# Patient Record
Sex: FEMALE | Race: WHITE | Employment: OTHER | URBAN - METROPOLITAN AREA
[De-identification: names, ages, dates, MRNs, and addresses within clinical notes are randomized per-mention and may not be internally consistent; named-entity substitution may affect disease eponyms.]

---

## 2017-07-31 PROBLEM — I10 ESSENTIAL HYPERTENSION: Status: ACTIVE | Noted: 2017-07-31

## 2017-07-31 PROBLEM — E78.00 HYPERCHOLESTEROLEMIA: Status: ACTIVE | Noted: 2017-07-31

## 2018-03-15 PROCEDURE — 87186 SC STD MICRODIL/AGAR DIL: CPT | Performed by: FAMILY MEDICINE

## 2018-03-15 PROCEDURE — 87088 URINE BACTERIA CULTURE: CPT | Performed by: FAMILY MEDICINE

## 2018-03-15 PROCEDURE — 81001 URINALYSIS AUTO W/SCOPE: CPT | Performed by: FAMILY MEDICINE

## 2018-03-15 PROCEDURE — 87086 URINE CULTURE/COLONY COUNT: CPT | Performed by: FAMILY MEDICINE

## 2018-04-18 PROCEDURE — 86606 ASPERGILLUS ANTIBODY: CPT | Performed by: FAMILY MEDICINE

## 2018-04-18 PROCEDURE — 86635 COCCIDIOIDES ANTIBODY: CPT | Performed by: FAMILY MEDICINE

## 2018-04-18 PROCEDURE — 86612 BLASTOMYCES ANTIBODY: CPT | Performed by: FAMILY MEDICINE

## 2018-04-18 PROCEDURE — 86698 HISTOPLASMA ANTIBODY: CPT | Performed by: FAMILY MEDICINE

## 2019-07-11 PROBLEM — R91.8 PULMONARY NODULES/LESIONS, MULTIPLE: Status: ACTIVE | Noted: 2019-07-11

## 2021-08-09 PROBLEM — M41.27 OTHER IDIOPATHIC SCOLIOSIS, LUMBOSACRAL REGION: Status: ACTIVE | Noted: 2021-08-09

## 2021-11-26 PROBLEM — I77.819 AORTIC ECTASIA (HCC): Status: ACTIVE | Noted: 2021-11-26

## 2021-11-26 PROBLEM — G31.9 DIFFUSE BRAIN ATROPHY: Status: ACTIVE | Noted: 2021-11-26

## 2021-11-26 PROBLEM — I70.0 AORTIC ATHEROSCLEROSIS (HCC): Status: ACTIVE | Noted: 2021-11-26

## 2021-11-26 PROBLEM — J44.9 COPD (CHRONIC OBSTRUCTIVE PULMONARY DISEASE) (HCC): Status: ACTIVE | Noted: 2021-11-26

## 2021-11-26 PROBLEM — G31.9 DIFFUSE BRAIN ATROPHY (HCC): Status: ACTIVE | Noted: 2021-11-26

## 2021-11-26 PROBLEM — I70.0 AORTIC ATHEROSCLEROSIS: Status: ACTIVE | Noted: 2021-11-26

## 2021-11-26 PROBLEM — I77.819 AORTIC ECTASIA: Status: ACTIVE | Noted: 2021-11-26

## 2022-03-02 PROBLEM — J30.0 CHRONIC VASOMOTOR RHINITIS: Status: ACTIVE | Noted: 2022-03-02

## 2025-04-18 NOTE — DISCHARGE INSTRUCTIONS
HOME INSTRUCTIONS  [facility] Post-[facility] Care Instructions:  Leave patch in place until follow up appointment.  Positioning:   Mild pain or irritation is normal. Take Tylenol as needed for mild pain.  Call the office immediately with severe pain at [phone] at any time of day or night.  No driving until cleared by your surgeon.    [facility] HOME CARE INSTRUCTIONS: POST-OP ANESTHESIA  The medication that you received for sedation or general anesthesia can last up to 24 hours. Your judgment and reflexes may be altered, even if you feel like your normal self.      We Recommend:   Do not drive any motor vehicle or bicycle   Avoid mowing the lawn, playing sports, or working with power tools/applicances (power saws, electric knives or mixers)   That you have someone stay with you on your first night home   Do not drink alcohol or take sleeping pills or tranquilizers   Do not sign legal documents within 24 hours of your procedure   If you had a nerve block for your surgery, take extra care not to put any pressure on your arm or hand for 24 hours    It is normal:  For you to have a sore throat if you had a breathing tube during surgery (while you were asleep!). The sore throat should get better within 48 hours. You can gargle with warm salt water (1/2 tsp in 4 oz warm water) or use a throat lozenge for comfort  To feel muscle aches or soreness especially in the abdomen, chest or neck. The achy feeling should go away in the next 24 hours  To feel weak, sleepy or \"wiped out\". Your should start feeling better in the next 24 hours.   To experience mild discomforts such as sore lip or tongue, headache, cramps, gas pains or a bloated feeling in your abdomen.   To experience mild back pain or soreness for a day or two if you had spinal or epidural anesthesia.   If you had laparoscopic surgery, to feel shoulder pain or discomfort on the day of surgery.   For some patients to have nausea after surgery/anesthesia    If you feel  nausea or experience vomiting:   Try to move around less.   Eat less than usual or drink only liquids until the next morning   Nausea should resolve in about 24 hours    If you have a problem when you are at home:    Call your surgeons office   Discharge Instructions: After Your Surgery  You’ve just had surgery. During surgery, you were given medicine called anesthesia to keep you relaxed and free of pain. After surgery, you may have some pain or nausea. This is common. Here are some tips for feeling better and getting well after surgery.   Going home  Your healthcare provider will show you how to take care of yourself when you go home. They'll also answer your questions. Have an adult family member or friend drive you home. For the first 24 hours after your surgery:   Don't drive or use heavy equipment.  Don't make important decisions or sign legal papers.  Take medicines as directed.  Don't drink alcohol.  Have someone stay with you, if needed. They can watch for problems and help keep you safe.  Be sure to go to all follow-up visits with your healthcare provider. And rest after your surgery for as long as your provider tells you to.   Coping with pain  If you have pain after surgery, pain medicine will help you feel better. Take it as directed, before pain becomes severe. Also, ask your healthcare provider or pharmacist about other ways to control pain. This might be with heat, ice, or relaxation. And follow any other instructions your surgeon or nurse gives you.      Stay on schedule with your medicine.     Tips for taking pain medicine  To get the best relief possible, remember these points:   Pain medicines can upset your stomach. Taking them with a little food may help.  Most pain relievers taken by mouth need at least 20 to 30 minutes to start to work.  Don't wait till your pain becomes severe before you take your medicine. Try to time your medicine so that you can take it before starting an activity. This  might be before you get dressed, go for a walk, or sit down for dinner.  Constipation is a common side effect of some pain medicines. Call your healthcare provider before taking any medicines, such as laxatives or stool softeners, to help ease constipation. Also ask if you should skip any foods. Drinking lots of fluids and eating foods, such as fruits and vegetables, that are high in fiber can also help. Remember, don't take laxatives unless your surgeon has prescribed them.  Drinking alcohol and taking pain medicine can cause dizziness and slow your breathing. It can even be deadly. Don't drink alcohol while taking pain medicine.  Pain medicine can make you react more slowly to things. Don't drive or run machinery while taking pain medicine.  Your healthcare provider may tell you to take acetaminophen to help ease your pain. Ask them how much you're supposed to take each day. Acetaminophen or other pain relievers may interact with your prescription medicines or other over-the-counter (OTC) medicines. Some prescription medicines have acetaminophen and other ingredients in them. Using both prescription and OTC acetaminophen for pain can cause you to accidentally overdose. Read the labels on your OTC medicines with care. This will help you to clearly know the list of ingredients, how much to take, and any warnings. It may also help you not take too much acetaminophen. If you have questions or don't understand the information, ask your pharmacist or healthcare provider to explain it to you before you take the OTC medicine.   Managing nausea  Some people have an upset stomach (nausea) after surgery. This is often because of anesthesia, pain, or pain medicine, less movement of food in the stomach, or the stress of surgery. These tips will help you handle nausea and eat healthy foods as you get better. If you were on a special food plan before surgery, ask your healthcare provider if you should follow it while you get  better. Check with your provider on how your eating should progress. It may depend on the surgery you had. These general tips may help:   Don't push yourself to eat. Your body will tell you when to eat and how much.  Start off with clear liquids and soup. They're easier to digest.  Next try semi-solid foods as you feel ready. These include mashed potatoes, applesauce, and gelatin.  Slowly move to solid foods. Don’t eat fatty, rich, or spicy foods at first.  Don't force yourself to have 3 large meals a day. Instead eat smaller amounts more often.  Take pain medicines with a small amount of solid food, such as crackers or toast. This helps prevent nausea.  When to call your healthcare provider  Call your healthcare provider right away if you have any of these:   You still have too much pain, or the pain gets worse, after taking the medicine. The medicine may not be strong enough. Or there may be a complication from the surgery.  You feel too sleepy, dizzy, or groggy. The medicine may be too strong.  Side effects, such as nausea or vomiting. Your healthcare provider may advise taking other medicines to treat these or may change your treatment plan..  Skin changes, such as rash, itching, or hives. This may mean you have an allergic reaction. Your provider may advise taking other medicines.  The incision looks different (for instance, part of it opens up).  Bleeding or fluid leaking from the incision site, and you weren't told to expect that.  Fever of 100.4°F (38°C) or higher, or as directed by your healthcare provider.  Call 911  Call 911 right away if you have:   Trouble breathing  Facial swelling    If you have obstructive sleep apnea   You were given anesthesia medicine during surgery to keep you comfortable and free of pain. After surgery, you may have more apnea spells because of this medicine and other medicines you were given. The spells may last longer than normal.    At home:  Keep using the continuous  positive airway pressure (CPAP) device when you sleep. Unless your healthcare provider tells you not to, use it when you sleep, day or night. CPAP is a common device used to treat obstructive sleep apnea.  Talk with your provider before taking any pain medicine, muscle relaxants, or sedatives. Your provider will tell you about the possible dangers of taking these medicines.  Contact your provider if your sleeping changes a lot even when taking medicines as directed.  [employer] last reviewed this educational content on 4/1/2024  This information is for informational purposes only. This is not intended to be a substitute for professional medical advice, diagnosis, or treatment. Always seek the advice and follow the directions from your physician or other qualified health care provider.  © 5229-5276 [employer]. All rights reserved. This information is not intended as a substitute for professional medical care. Always follow your healthcare professional's instructions.

## 2025-04-21 ENCOUNTER — HOSPITAL ENCOUNTER (OUTPATIENT)
Facility: HOSPITAL | Age: OVER 89
Setting detail: HOSPITAL OUTPATIENT SURGERY
Discharge: HOME | End: 2025-04-21
Attending: OPHTHALMOLOGY | Admitting: OPHTHALMOLOGY
Payer: MEDICARE

## 2025-04-21 ENCOUNTER — ANESTHESIA (OUTPATIENT)
Dept: SURGERY | Facility: HOSPITAL | Age: OVER 89
End: 2025-04-21
Payer: MEDICARE

## 2025-04-21 ENCOUNTER — ANESTHESIA EVENT (OUTPATIENT)
Dept: SURGERY | Facility: HOSPITAL | Age: OVER 89
End: 2025-04-21
Payer: MEDICARE

## 2025-04-21 VITALS
WEIGHT: 125 LBS | DIASTOLIC BLOOD PRESSURE: 45 MMHG | TEMPERATURE: 98 F | HEART RATE: 85 BPM | HEIGHT: 60 IN | OXYGEN SATURATION: 95 % | BODY MASS INDEX: 24.54 KG/M2 | RESPIRATION RATE: 14 BRPM | SYSTOLIC BLOOD PRESSURE: 117 MMHG

## 2025-04-21 DEVICE — AKREOS ADVANCED OPTICS ASPHERIC LENS +17.00D
Type: IMPLANTABLE DEVICE | Site: EYE | Status: FUNCTIONAL
Brand: AKREOS® IOL

## 2025-04-21 RX ORDER — SODIUM CHLORIDE, SODIUM LACTATE, POTASSIUM CHLORIDE, CALCIUM CHLORIDE 600; 310; 30; 20 MG/100ML; MG/100ML; MG/100ML; MG/100ML
INJECTION, SOLUTION INTRAVENOUS CONTINUOUS
Status: DISCONTINUED | OUTPATIENT
Start: 2025-04-21 | End: 2025-04-21

## 2025-04-21 RX ORDER — ONDANSETRON 2 MG/ML
INJECTION INTRAMUSCULAR; INTRAVENOUS AS NEEDED
Status: DISCONTINUED | OUTPATIENT
Start: 2025-04-21 | End: 2025-04-21 | Stop reason: SURG

## 2025-04-21 RX ORDER — HYDROMORPHONE HYDROCHLORIDE 1 MG/ML
0.4 INJECTION, SOLUTION INTRAMUSCULAR; INTRAVENOUS; SUBCUTANEOUS EVERY 5 MIN PRN
Status: DISCONTINUED | OUTPATIENT
Start: 2025-04-21 | End: 2025-04-21

## 2025-04-21 RX ORDER — LIDOCAINE HYDROCHLORIDE 10 MG/ML
INJECTION, SOLUTION EPIDURAL; INFILTRATION; INTRACAUDAL; PERINEURAL AS NEEDED
Status: DISCONTINUED | OUTPATIENT
Start: 2025-04-21 | End: 2025-04-21 | Stop reason: SURG

## 2025-04-21 RX ORDER — PHENYLEPHRINE HYDROCHLORIDE 25 MG/ML
2 SOLUTION/ DROPS OPHTHALMIC SEE ADMIN INSTRUCTIONS
Status: COMPLETED | OUTPATIENT
Start: 2025-04-21 | End: 2025-04-21

## 2025-04-21 RX ORDER — PHENYLEPHRINE HCL 10 MG/ML
VIAL (ML) INJECTION AS NEEDED
Status: DISCONTINUED | OUTPATIENT
Start: 2025-04-21 | End: 2025-04-21 | Stop reason: SURG

## 2025-04-21 RX ORDER — ROCURONIUM BROMIDE 10 MG/ML
INJECTION, SOLUTION INTRAVENOUS AS NEEDED
Status: DISCONTINUED | OUTPATIENT
Start: 2025-04-21 | End: 2025-04-21 | Stop reason: SURG

## 2025-04-21 RX ORDER — GENTAMICIN SULFATE 3 MG/ML
2 SOLUTION/ DROPS OPHTHALMIC SEE ADMIN INSTRUCTIONS
Status: COMPLETED | OUTPATIENT
Start: 2025-04-21 | End: 2025-04-21

## 2025-04-21 RX ORDER — EPHEDRINE SULFATE 50 MG/ML
INJECTION, SOLUTION INTRAVENOUS AS NEEDED
Status: DISCONTINUED | OUTPATIENT
Start: 2025-04-21 | End: 2025-04-21 | Stop reason: SURG

## 2025-04-21 RX ORDER — ATROPINE SULFATE 10 MG/ML
1 SOLUTION/ DROPS OPHTHALMIC SEE ADMIN INSTRUCTIONS
Status: COMPLETED | OUTPATIENT
Start: 2025-04-21 | End: 2025-04-21

## 2025-04-21 RX ORDER — DEXAMETHASONE SODIUM PHOSPHATE 4 MG/ML
VIAL (ML) INJECTION AS NEEDED
Status: DISCONTINUED | OUTPATIENT
Start: 2025-04-21 | End: 2025-04-21 | Stop reason: SURG

## 2025-04-21 RX ORDER — LIDOCAINE HYDROCHLORIDE 40 MG/ML
SOLUTION TOPICAL AS NEEDED
Status: DISCONTINUED | OUTPATIENT
Start: 2025-04-21 | End: 2025-04-21 | Stop reason: SURG

## 2025-04-21 RX ORDER — BALANCED SALT SOLUTION 6.4; .75; .48; .3; 3.9; 1.7 MG/ML; MG/ML; MG/ML; MG/ML; MG/ML; MG/ML
SOLUTION OPHTHALMIC AS NEEDED
Status: DISCONTINUED | OUTPATIENT
Start: 2025-04-21 | End: 2025-04-21 | Stop reason: HOSPADM

## 2025-04-21 RX ORDER — METOCLOPRAMIDE HYDROCHLORIDE 5 MG/ML
10 INJECTION INTRAMUSCULAR; INTRAVENOUS EVERY 8 HOURS PRN
Status: DISCONTINUED | OUTPATIENT
Start: 2025-04-21 | End: 2025-04-21

## 2025-04-21 RX ORDER — HYDROMORPHONE HYDROCHLORIDE 1 MG/ML
0.6 INJECTION, SOLUTION INTRAMUSCULAR; INTRAVENOUS; SUBCUTANEOUS EVERY 5 MIN PRN
Status: DISCONTINUED | OUTPATIENT
Start: 2025-04-21 | End: 2025-04-21

## 2025-04-21 RX ORDER — NALOXONE HYDROCHLORIDE 0.4 MG/ML
0.08 INJECTION, SOLUTION INTRAMUSCULAR; INTRAVENOUS; SUBCUTANEOUS AS NEEDED
Status: DISCONTINUED | OUTPATIENT
Start: 2025-04-21 | End: 2025-04-21

## 2025-04-21 RX ORDER — ONDANSETRON 2 MG/ML
4 INJECTION INTRAMUSCULAR; INTRAVENOUS EVERY 6 HOURS PRN
Status: DISCONTINUED | OUTPATIENT
Start: 2025-04-21 | End: 2025-04-21

## 2025-04-21 RX ORDER — ATROPINE SULFATE 10 MG/ML
SOLUTION/ DROPS OPHTHALMIC AS NEEDED
Status: DISCONTINUED | OUTPATIENT
Start: 2025-04-21 | End: 2025-04-21 | Stop reason: HOSPADM

## 2025-04-21 RX ORDER — ACETAMINOPHEN 500 MG
1000 TABLET ORAL ONCE
Status: COMPLETED | OUTPATIENT
Start: 2025-04-21 | End: 2025-04-21

## 2025-04-21 RX ORDER — HYDROMORPHONE HYDROCHLORIDE 1 MG/ML
0.2 INJECTION, SOLUTION INTRAMUSCULAR; INTRAVENOUS; SUBCUTANEOUS EVERY 5 MIN PRN
Status: DISCONTINUED | OUTPATIENT
Start: 2025-04-21 | End: 2025-04-21

## 2025-04-21 RX ADMIN — PHENYLEPHRINE HCL 100 MCG: 10 MG/ML VIAL (ML) INJECTION at 12:14:00

## 2025-04-21 RX ADMIN — EPHEDRINE SULFATE 10 MG: 50 INJECTION, SOLUTION INTRAVENOUS at 12:29:00

## 2025-04-21 RX ADMIN — PHENYLEPHRINE HCL 100 MCG: 10 MG/ML VIAL (ML) INJECTION at 12:31:00

## 2025-04-21 RX ADMIN — ROCURONIUM BROMIDE 40 MG: 10 INJECTION, SOLUTION INTRAVENOUS at 11:59:00

## 2025-04-21 RX ADMIN — PHENYLEPHRINE HCL 100 MCG: 10 MG/ML VIAL (ML) INJECTION at 12:09:00

## 2025-04-21 RX ADMIN — PHENYLEPHRINE HCL 100 MCG: 10 MG/ML VIAL (ML) INJECTION at 13:04:00

## 2025-04-21 RX ADMIN — EPHEDRINE SULFATE 10 MG: 50 INJECTION, SOLUTION INTRAVENOUS at 12:34:00

## 2025-04-21 RX ADMIN — LIDOCAINE HYDROCHLORIDE 4 ML: 40 SOLUTION TOPICAL at 12:06:00

## 2025-04-21 RX ADMIN — SODIUM CHLORIDE, SODIUM LACTATE, POTASSIUM CHLORIDE, CALCIUM CHLORIDE: 600; 310; 30; 20 INJECTION, SOLUTION INTRAVENOUS at 12:39:00

## 2025-04-21 RX ADMIN — EPHEDRINE SULFATE 10 MG: 50 INJECTION, SOLUTION INTRAVENOUS at 13:06:00

## 2025-04-21 RX ADMIN — ROCURONIUM BROMIDE 10 MG: 10 INJECTION, SOLUTION INTRAVENOUS at 12:49:00

## 2025-04-21 RX ADMIN — PHENYLEPHRINE HCL 100 MCG: 10 MG/ML VIAL (ML) INJECTION at 12:22:00

## 2025-04-21 RX ADMIN — ONDANSETRON 4 MG: 2 INJECTION INTRAMUSCULAR; INTRAVENOUS at 12:18:00

## 2025-04-21 RX ADMIN — DEXAMETHASONE SODIUM PHOSPHATE 4 MG: 4 MG/ML VIAL (ML) INJECTION at 12:18:00

## 2025-04-21 RX ADMIN — LIDOCAINE HYDROCHLORIDE 50 MG: 10 INJECTION, SOLUTION EPIDURAL; INFILTRATION; INTRACAUDAL; PERINEURAL at 11:59:00

## 2025-04-21 NOTE — ANESTHESIA PREPROCEDURE EVALUATION
Anesthesia PreOp Note    HPI:     Sharmin Ch is a 91 year old female who presents for preoperative consultation requested by: William David MD    Date of Surgery: 4/21/2025    Procedure(s):  Pars plana vitrectomy, removal of dislocated posterior chamber intraocular lens, sutured posterior chamber intraocular lens, right eye  Indication: Dislocated lens right eye    Relevant Problems   No relevant active problems       NPO: >8h                         History Review:  Patient Active Problem List    Diagnosis Date Noted    Chronic vasomotor rhinitis 03/02/2022    Aortic ectasia 11/26/2021    Aortic atherosclerosis 11/26/2021    COPD (chronic obstructive pulmonary disease) (HCC) 11/26/2021    Diffuse brain atrophy 11/26/2021    Other idiopathic scoliosis, lumbosacral region 08/09/2021    Pulmonary nodules/lesions, multiple 07/11/2019    Essential hypertension 07/31/2017    Hypercholesterolemia 07/31/2017       Past Medical History[1]    Past Surgical History[2]    Prescriptions Prior to Admission[3]  Current Medications and Prescriptions Ordered in Epic[4]    Allergies[5]    Family History[6]  Social Hx on file[7]    Available pre-op labs reviewed.             Vital Signs:  Body mass index is 23.41 kg/m².   height is 1.575 m (5' 2\") and weight is 58.1 kg (128 lb).   Vitals:    04/17/25 1305   Weight: 58.1 kg (128 lb)   Height: 1.575 m (5' 2\")        Anesthesia Evaluation     Patient summary reviewed and Nursing notes reviewed    No history of anesthetic complications   Airway   Mallampati: II  TM distance: <3 FB  Neck ROM: limited  Dental - Dentition appears grossly intact     Pulmonary - normal exam   Cardiovascular - normal exam  (+) hypertension    Neuro/Psych - negative ROS     GI/Hepatic/Renal    (+) GERD    Endo/Other    Abdominal                  Anesthesia Plan:   ASA:  2  Plan:   General  Airway:  ETT  Post-op Pain Management: Oral pain medication and IV analgesics  Informed Consent Plan and Risks  Discussed With:  Patient and child/children  Discussed plan with:  CRNA      I have informed Sharmin A Dima and/or legal guardian or family member of the nature of the anesthetic plan, benefits, risks including possible dental damage if relevant, major complications, and any alternative forms of anesthetic management.   All of the patient's questions were answered to the best of my ability. The patient desires the anesthetic management as planned.  Bárbara Hollins MD  2025 9:50 AM  Present on Admission:  **None**           [1]   Past Medical History:   Esophageal reflux    H/O tuberculosis    14 yrs old    Hearing impaired person, bilateral    hearing aids    High blood pressure    Neuropathy    Osteoarthritis    Scoliosis    Visual impairment    rt eye blurred vision, wears glasses   [2]   Past Surgical History:  Procedure Laterality Date    Colonoscopy            x3   [3]   Medications Prior to Admission   Medication Sig Dispense Refill Last Dose/Taking    LISINOPRIL-HYDROCHLOROTHIAZIDE 20-25 MG Oral Tab TAKE 1/2 (ONE-HALF) TABLET BY MOUTH EVERY DAY (Patient taking differently: in the morning.) 45 tablet 0 Taking Differently    simvastatin 20 MG Oral Tab Take 1 tablet (20 mg total) by mouth nightly. 90 tablet 1 Taking    aspirin 81 MG Oral Tab Take 1 tablet (81 mg total) by mouth in the morning.   Taking    Ipratropium Bromide 0.03 % Nasal Solution 2 sprays by Nasal route every 12 (twelve) hours. (Patient not taking: Reported on 2025) 1 each 11 Not Taking   [4]   Current Facility-Administered Medications Ordered in Epic   Medication Dose Route Frequency Provider Last Rate Last Admin    lactated ringers infusion   Intravenous Continuous William David MD        acetaminophen (Tylenol Extra Strength) tab 1,000 mg  1,000 mg Oral Once William David MD        atropine (Atropine-Care) 1 % ophthalmic solution 1 drop  1 drop Right Eye See Admin Instructions William David MD        phenylephrine  (Mydfrin) 2.5 % ophthalmic solution 2 drop  2 drop Right Eye See Admin Instructions William David MD        gentamicin (Garamycin) 0.3 % ophthalmic solution 2 drop  2 drop Right Eye See Admin Instructions William David MD         No current [facility]-ordered outpatient medications on file.   [5]   Allergies  Allergen Reactions    Egg OTHER (SEE COMMENTS)     \"Passed out\"    Morphine UNKNOWN     Can't remember reactions    Seasonal OTHER (SEE COMMENTS)     Sneezing   [6]   Family History  Problem Relation Age of Onset    Other (carotid artery) Father     Heart Disorder Mother    [7]   Social History  Socioeconomic History    Marital status:    Tobacco Use    Smoking status: Never     Passive exposure: Yes    Smokeless tobacco: Never   Vaping Use    Vaping status: Never Used   Substance and Sexual Activity    Alcohol use: Not Currently     Comment: occasional    Drug use: No

## 2025-04-21 NOTE — OPERATIVE REPORT
[facility]    PATIENT'S NAME: JAYLEN GARCIA   ATTENDING PHYSICIAN: William David MD   OPERATING PHYSICIAN: William David MD   PATIENT ACCOUNT#:   [id number]    LOCATION:  [place] [address]  MEDICAL RECORD #:   [id number]       YOB: 1933  ADMISSION DATE:       04/21/2025      OPERATION DATE:  04/21/2025    OPERATIVE REPORT      PREOPERATIVE DIAGNOSIS:  Dislocated posterior chamber intraocular lens, right eye.  POSTOPERATIVE DIAGNOSIS:  Dislocated posterior chamber intraocular lens, right eye.  PROCEDURE:  Pars plana vitrectomy, removal of the dislocated posterior chamber intraocular lens and sutured placement of an Akreos 17.0 diopter posterior chamber intraocular lens, right eye.    ANESTHESIA:  General with endotracheal intubation plus retrobulbar block, right eye.    COMPLICATIONS:  None.    INDICATIONS:  The patient is a 91-year-old female with complaints of decreased vision in her right eye over the past month.  Examination revealed a visual acuity of 20/30 -2 OD and 20/20 -1 OS.  External and extraocular motility examinations were normal.  Slit lamp examination of the right eye revealed a severely dislocated posterior chamber intraocular lens with a surrounding So[place]mering's.  Slit-lamp examination of the left eye revealed a well-positioned posterior chamber intraocular lens with a clear posterior capsule.  Her intraocular pressures measured 18 OD and 15 OS.  Dilated fundus examination of the right eye revealed a cup-to-disc ratio of 0.3.  The retinal arterioles were mildly attenuated.  The macula was normal.  The retinal periphery was normal.  The retina was attached for 360 degrees.  There were no open retinal tears, holes, or breaks identified for 360 degrees.  There was a posterior vitreous detachment.  Dilated fundus examination of the left eye revealed a cup-to-disc ratio of 0.3.  The retinal arterioles were mildly attenuated.  The macula was normal.  The retinal  periphery was normal.  The retina was attached for 360 degrees.  There were no open retinal tears, holes, or breaks identified for 360 degrees.  There was a posterior vitreous detachment.  After a thorough discussion of the risks, benefits, and alternative treatment options, pars plana vitrectomy with removal of the dislocated posterior chamber intraocular lens and sutured placement of an Akreos posterior chamber intraocular lens versus anterior chamber intraocular lens was advised for her right eye.  The risks of surgery discussed included, but were not limited to, pain, infection, bleeding, redness, loss of vision, loss of the eye, need for further surgery, retinal detachment, glaucoma, corneal decompensation, aphakia, scarring, swelling, double vision, optic neuropathy, as well as anesthetic-related risks such as death, myocardial infarction, stroke, pulmonary embolism, etc.  After careful consideration of the above discussion, the patient wished to proceed with the planned surgery for her right eye.    OPERATIVE TECHNIQUE:  The patient was brought to the operating room where general anesthesia with endotracheal intubation was induced without difficulty by the department of anesthesiology.  A retrobulbar injection of a 50/50 mixture of 2% lidocaine and 0.75% Marcaine was given to the retrobulbar space of the right eye using a flat grind, 25-gauge needle.  A total of 4 mL of this anesthetic was delivered to the right retrobulbar space to help control both intraoperative and postoperative pain.  The right eye was then prepped and draped in the usual sterile manner.  A heavy wire lid speculum was placed between the lids of the right eye.  A marking pen was used to steve the nasal and temporal horizontal meridians.  A temporal and nasal trapdoor conjunctival peritomy were performed using a pair of 0.5 Castroviejo forceps and Armen scissors.  Hemostasis was achieved using the eraser cautery.  A caliper was used to  measure 3.5 mm posterior to the corneoscleral limbus in the inferotemporal quadrant and an area of intact conjunctiva.  A 25-gauge trocar was placed in a beveled fashion after appropriate displacement of the conjunctiva through this 3.5 mm steve.  The trocar was directed into the vitreous cavity and connected to a 25-gauge infusion cannula.  The position of the infusion cannula was identified within the vitreous cavity and found to be free of retinal tissue.  The infusion was turned on.  Next, a caliper was used to measure 2.5 mm superior and 2.5 mm posterior to the previously-marked breaks along the limbus nasally and temporally.  A similar 2.5 mm inferior and 2.5 mm posterior steve was made both nasally and temporally.  A 25-gauge trocar was placed through each of the superior marks perpendicular to the globe.  The trocar blade was then used to create inferonasal and inferotemporal sclerotomies through the inferior marks.  Viscoat was placed over the cornea of the right eye.  The microscope was positioned over the cornea of the right eye using the binocular indirect operating microscope attachment.  A light pipe was inserted into the superonasal sclerotomy site and a Microvit into the superotemporal sclerotomy site.  A limited anterior vitrectomy was performed.  The intraocular lens was severely dislocated inferiorly.  The instruments were removed.  A 2.75 mm keratome was inserted into the anterior chamber through the superior limbus.  Viscoat was injected into the anterior chamber to coat the corneal endothelium.  A long angled Bush forceps was used to grab the intraocular lens optic which was slightly elevated and rotated allowing for visualization of the haptic.  The forceps was removed and a Sinskey hook placed through the superior limbal wound.  The haptic was engaged with the Sinskey hook and externalized through the limbal wound.  The limbal wound was then extended using the keratome and a long angled  Bush used to grab the intraocular lens optic, and the optic and attached haptics were removed from the eye.  A moderate amount of cortical lens material also was removed with the intraocular lens.  The limbal wound was temporarily closed with a single 10-0 nylon suture.  The binocular indirect operating microscope attachment was replaced over the microscope.  A light pipe was inserted into the superonasal sclerotomy site and a Microvit into the superotemporal sclerotomy site.  With suction set between 50 and 600 mmHg and cutting set at 20,000 cuts per minute, a posterior vitrectomy was performed.  The posterior hyaloid was entered in the superotemporal quadrant and opened in a \"can opener\" fashion for 360 degrees.  The vitreous was then trimmed anteriorly to an appropriate vitreous skirt for 360 degrees.  The fundus was examined peripherally.  There were no open retinal tears, holes, or breaks identified for 360 degrees.  Instruments were removed.  The binocular indirect operating microscope attachment was removed.  The microscope light was turned off and attention directed to the [place] stand where a 17.0 diopter Akreos posterior chamber intraocular lens was prepared.  An 8-0 Plymouth-John suture was passed through the nasal and temporal haptics in an anterior to posterior, then posterior to anterior fashion so that the suture would be placed in the eye as far away from the iris as possible.  The intraocular lens and attached Plymouth-John sutures were then brought to the operating field.  The microscope light was turned on and a bent MaxGrip forceps was then passed through the inferonasal sclerotomy.  The inferonasal Plymouth-John suture was then passed through the limbal wound with a second MaxGrip forceps.  A handshake maneuver was used to externalize the inferonasal Plymouth-John suture through the inferonasal sclerotomy site.  In a similar fashion, the superonasal Plymouth-John suture was passed through the limbal wound and  externalized through the superonasal sclerotomy.  Care was taken to maintain no tangling of these sutures.  A similar fashion was then used to pass the inferotemporal and superotemporal 8-0 Worden-John sutures.  There was no tangling of these sutures.  The superior limbal wound was opened and the previously-placed 10-0 nylon suture removed.  The intraocular lens was folded with the attached Worden-Jonh sutures and the folded lens and attached sutures passed through the limbal wound into the posterior chamber.  The forceps was removed and the 8-0 Worden-John sutures slowly withdrawn.  Of note, prior to placement of the lens additional Viscoat was placed in the anterior chamber to coat the corneal endothelium.  The limbal wound was closed with a single 10-0 nylon suture.  The suture was tightened and tied permanently.  The knot was rotated and buried.  The wound was checked and found to be watertight.  The previously-placed trocars were removed.  The 8-0 Worden-John sutures were then tightened and tied permanently in a 3-1-1 knot.  Care was taken to maintain equal tension on the sutures nasally and temporally so as to maintain adequate centration of the intraocular lens.  The lens did appear to be well centered.  The 8-0 Worden-John sutures were trimmed and the knot rotated and buried into the eye.  The knot was then rotated so as to plug the lumen of the superior sclerotomies.  The conjunctiva was then reapproximated to its normal anatomic position and held in place with multiple interrupted 6-0 plain sutures.  The 8-0 Worden-John sutures were well covered by conjunctiva.  The infusion cannula and inferotemporal trocar were removed and a cotton-tipped applicator was placed over the inferotemporal sclerotomy site for a period of approximately 10 seconds.  The wound was checked and found to be watertight.  The intraocular pressure was checked and felt to be appropriate.  TobraDex ointment was placed over the cornea of the right eye.   The lid speculum was removed and the eyelids closed.  An eye patch and Moreira shield were placed over the right eye.  The patient was awakened from anesthesia and brought to the recovery room in excellent condition after having tolerated the procedure well.  There were no complications.     Dictated By William David MD  d: 04/21/2025 13:39:40  t: 04/21/2025 15:48:03  Job [id number]  ABW/

## 2025-04-21 NOTE — ANESTHESIA POSTPROCEDURE EVALUATION
Patient: Sharmin Ch    Procedure Summary       Date: 04/21/25 Room / Location: [facility] MAIN OR 03 / [facility] MAIN OR    Anesthesia Start: 1156 Anesthesia Stop:     Procedure: Pars Plana Vitrectomy, Removal of Dislocated PCIOL, Sutured Akreos 17.0 Diopter PCIOL Right Eye (Right: Eye) Diagnosis: (Dislocated lens right eye)    Surgeons: William David MD Anesthesiologist: Bárbara Hollins MD    Anesthesia Type: general ASA Status: 2            Anesthesia Type: general    Vitals Value Taken Time   /57 04/21/25 13:35   Temp 97.5 °F (36.4 °C) 04/21/25 13:35   Pulse 79 04/21/25 13:35   Resp 9 04/21/25 13:35   SpO2 100 % 04/21/25 13:35   Vitals shown include unfiled device data.    [facility] AN Post Evaluation:   Patient Evaluated in PACU  Patient Participation: complete - patient participated  Level of Consciousness: sleepy but conscious  Pain Score: 0  Pain Management: adequate  Airway Patency:patent  Dental exam unchanged from preop  Yes    Cardiovascular Status: stable and acceptable  Respiratory Status: acceptable and room air  Postoperative Hydration acceptable      DEBBIE RODRIGUEZ CRNA  4/21/2025 1:36 PM

## 2025-04-21 NOTE — BRIEF OP NOTE
Pre-Operative Diagnosis: Dislocated intraocular lens right eye     Post-Operative Diagnosis: Dislocated intraocular lens right eye     Procedure Performed:   Pars Plana Vitrectomy, Removal of Dislocated PCIOL, Sutured Akreos 17.0 Diopter PCIOL Right Eye    Surgeons and Role:     * William David MD - Primary    Assistant(s):  None      Surgical Findings: Dislocated intraocular lens right eye     Specimen: Dislocated intraocular lens right eye     Estimated Blood Loss: No data recorded    Dictation Number:      William David MD, MD  4/21/2025  1:27 PM

## 2025-04-21 NOTE — ANESTHESIA PROCEDURE NOTES
Airway  Date/Time: 4/21/2025 12:06 PM  Reason: Elective    Airway not difficult    General Information and Staff   Patient location during procedure: OR  Anesthesiologist: Bárbara Hollins MD  Resident/CRNA: Alisa Krueger CRNA  Performed: CRNA   Performed by: Alisa Krueger CRNA  Authorized by: Bárbara Hollins MD        Indications and Patient Condition  Indications for airway management: anesthesia  Sedation level: deep      Preoxygenated: yesPatient position: sniffing    Mask difficulty assessment: 1 - vent by mask    Final Airway Details    Final airway type: endotracheal airway    Successful airway: ETT  Cuffed: yes   Successful intubation technique: Video laryngoscopy  Endotracheal tube insertion site: oral  Blade: [place]  Blade size: #3  ETT size (mm): 7.0    Cormack-Lehane Classification: grade I - full view of glottis  Placement verified by: capnometry   Cuff volume (mL): 8  Measured from: lips  ETT to lips (cm): 20  Number of attempts at approach: 1  Number of other approaches attempted: 0

## (undated) DEVICE — Device

## (undated) DEVICE — SYRINGE TB 29G

## (undated) DEVICE — RETINAL: Brand: MEDLINE INDUSTRIES, INC.

## (undated) DEVICE — I-KNIFE II SLIT KNIFE 2.75 MM ANGLED: Brand: I-KNIFE; SATINSLIT®

## (undated) DEVICE — SUT PLN GUT 6-0 18IN TG140-8 ABSRB TAN YELLOW

## (undated) DEVICE — APPLICATOR,COTTON-TIP,WOOD,3,STRL: Brand: MEDLINE

## (undated) DEVICE — PACK SRG BIOM FULL DRP STRL

## (undated) DEVICE — IOL MANIPULOATOR [SINSKEY] BLUNT TIP: Brand: VISITEC®

## (undated) DEVICE — EYE PAK* 1030 NON-WOVEN OPHTHALMIC DRAPE INCISE POUCHES: Brand: ALCON EYE-PAK

## (undated) DEVICE — 20 GA BLUNT TIP: Brand: WET-FIELD® ERASER®

## (undated) DEVICE — 25+®GA HYPERVIT®  BEVEL 20K CPM TOTAL PLUS® VITRECTOMY PAK: Brand: CONSTELLATION® HYPERVIT® TOTAL PLUS®

## (undated) DEVICE — 3M™ MICROFOAM™ SURGICAL TAPE, 2 INCHES X 5 YARDS, 6 ROLLS/CARTON, 6 CARTONS/CASE, 1528-2: Brand: 3M™ MICROFOAM™

## (undated) DEVICE — ENCORE® LATEX MICRO SIZE 6.5, STERILE LATEX POWDER-FREE SURGICAL GLOVE: Brand: ENCORE

## (undated) DEVICE — GRIESHABER MAXGRIP® REV DSP FORCEPS, 25G: Brand: ALCON GRIESHABER FINESSE MAXGRIP REVOLUTION

## (undated) DEVICE — SOLUTION PREP 30ML 5% POVIDONE IOD EYE BDINE

## (undated) DEVICE — SOLUTION IRRIG 1000ML ST H2O AQUALITE PLAS

## (undated) DEVICE — SOLUTION IRRIG 250ML 0.9% NACL

## (undated) DEVICE — SOLUTION IRRIG 500ML BAL SALT 2 CHMBR GLS BSS

## (undated) DEVICE — CABLE BPLR L12FT FLYING LD DISP

## (undated) DEVICE — SUT ETHLN 10-0 6IN CSC-6 NABSRB BLK 5MM COMPN